# Patient Record
(demographics unavailable — no encounter records)

---

## 2024-11-18 NOTE — PHYSICAL EXAM
[de-identified] : The neck appears flat.  The incision and drain site are clean and healing well.  The Steri-strips were changed. [Midline] : located in midline position [Normal] : orientation to person, place, and time: normal [de-identified] : Extremities: HYDE x 4.   Skin: No obvious skin lesions.   Voice: clear

## 2024-11-18 NOTE — ASSESSMENT
[FreeTextEntry1] : Assessment:  51-year-old man, with history significant for subcentimeter right thyroid cysts, presents for post-op follow-up status post left thyroid lobectomy with isthmusectomy.  Plan: - The surgical pathology is pending.  I will call him once the report has been finalized to review the results. - I instructed him to keep the incision covered and change the Steri-Strips as necessary for a total of 1-month post-op.  Methods for optimal scar care were reviewed. - He was instructed to follow-up with Dr. Aburto in the coming weeks and was encouraged to follow-up with me PRN or at six-months post-op.

## 2024-11-18 NOTE — HISTORY OF PRESENT ILLNESS
[de-identified] : Mr. Lovell presents for post-operative follow-up, status post left thyroid lobectomy with isthmusectomy on 11/08/2024.  He states that he has been doing well and denies pain, dysphagia, or hoarseness.

## 2024-12-02 NOTE — HISTORY OF PRESENT ILLNESS
[FreeTextEntry1] : Mr. Lovell is a 51-year-old male who returns today with regard to hx of nodular thyroid and Diabetes Mellitus type 2.   He did undergo fna of a left sided thyroid nodule February 2020 with initial results c/w [place] IV-suspicious for follicular neoplasm. The aspirated material was subsequently sent out for Thyroseq genetic testing and the results showed no mutations evident, ie negative for all tested genetic alterations associated with thyroid cancer with low risk for malignancy. This was thoroughly explained to patient and discussed in detail.  The left sided nodule was a cystic/complex which at time of fna was very large measuring 6.9 x 3.9 x 5.6 cm f/u  tHYROID us 10/08/24  - 9 mm colloid cyst in right thyroid upper pole TR1 - 8 mm colloid cyst in right midpole TR1  - Left lobe is replaced by 7.9 x 7.0 x 5.7 cm complex cystic nodule containing internal debris, minimally increasing size. (TIRAD -2).  CT scan of the neck, performed 10/08/2024 ([facility]), revealed a 9.4 cm left thyroid nodule resulting in right-sided tracheal deviation. There was no substernal extension.  **** Patient is now s/p left thyroid lobectomy with isthmusectomy on 11/8/24 with Dr. 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 The margins were all negative and there was no extrathyroidal extension or lymphovascular invasion appreciated. There were no lymph nodes sampled. Finaly, there was one parathyroid gland noted which represents one of the unidentified left-sided glands.  patient will be proceeding with careful observation of right thyroid lobe with f/u with Dr. Junior in 6 months  ____________________________________________________  He too has hx of type 2 DM on Metformin 1000 mg BID and Farxiga 10 mg QD.  He has not been carrying out HGM. There has been no significant hypoglycemic s/s.  He denies polyuria, polydipsia, or any visual changes. He too denies any skin lesions, skin breakdown or non-healing areas of skin. He too denies any podiatric concerns. Ophthalmologic evaluation is up to date, no diabetic retinopathy.  POCT A1C returned today 6.8% POCT glucose returned today at  124 mg/dl  ______________________________________________ Additional medical history includes HTN and HLD, afib (last episode Dec 2022) with loop device placed, sleep apnea dx -follows with Dr. Chester-uses dental cpap machine  He is taking enalapril 5 mg, metoprolol 50and Rosuvastatin 10 mg, Eliquis, off vitamin D   He stopped smoking cigars in December 2022 and has now significantly decreased alcohol consumption.  PCP: Dr. Jevon Ambrosio in [place] Cardiologist: Dr. Meléndez at [facility] Cardiologist :Dr. Pelayo in [place]

## 2024-12-02 NOTE — ADDENDUM
[FreeTextEntry1] : poct glucose and a1c were carried out today given diabetes diagnosis blood will be drawn in the office today  Today's evaluation reviewed with Dr. Aburto along with any changes and plan of care.

## 2025-05-19 NOTE — HISTORY OF PRESENT ILLNESS
[FreeTextEntry1] : Mr. Lovell is a 51-year-old male who returns today with regard to hx of nodular thyroid and Diabetes Mellitus type 2. Too has hx of acquired hypothyroidism.  Additional medical history includes hypertension and hyperlipidemia, afib (last episode Dec 2022) with loop device placed, sleep apnea dx -follows with Dr. Ko-uses dental cpap machine - May have to have battery in loop recorder replaced in September 2025. - Due for a colonoscopy, needs to make an appointment  He did undergo fna of a left sided thyroid nodule February 2020 with initial results c/w [place] IV-suspicious for follicular neoplasm. The aspirated material was subsequently sent out for Thyroseq genetic testing and the results showed no mutations evident, ie negative for all tested genetic alterations associated with thyroid cancer with low risk for malignancy. This was thoroughly explained to patient and discussed in detail.  The left sided nodule was a cystic/complex which at time of fna was very large measuring 6.9 x 3.9 x 5.6 cm f/u.  Thyroid us 10/08/24  - 9 mm colloid cyst in right thyroid upper pole TR1 - 8 mm colloid cyst in right midpole TR1  - Left lobe is replaced by 7.9 x 7.0 x 5.7 cm complex cystic nodule containing internal debris, minimally increasing size. (TIRAD -2).  CT scan of the neck, performed 10/08/2024 ([facility]), revealed a 9.4 cm left thyroid nodule resulting in right-sided tracheal deviation. There was no substernal extension.  **** Patient is now s/p left thyroid lobectomy with isthmectomy on 11/8/24 with Dr. 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 The margins were all negative and there was no extrathyroidal extension or lymphovascular invasion appreciated. There were no lymph nodes sampled. Finally, there was one parathyroid gland noted which represents one of the unidentified left-sided glands.  Patient will be proceeding with careful observation of right thyroid lobe with f/u with Dr. Junior in 6 months    He is now taking LT4 50 mcg daily after labs 12/02/24 showed TSH at 4.33. He ran out of LT4 today.  He has been compliant in taking the LT4 daily, away from food or any medication that may inhibit absorption. He has tolerated this medication well without any apparent adverse effects. He denies any temperature intolerance, significant weight changes, or severe fatigue. He in addition denies any palpitations, tremors, anxiousness, change in bowel habits or significant change in moods. ____________________________________________________________________________________________________ He too has hx of type 2 DM. With regard to neuropathy, he reports of intermittent tingling in b/l LE.  He continues on Metformin 1000 mg BID and Farxiga 10 mg QD. Overall, tolerating the medications well.  He has not been carrying out HGM. There has been no significant hypoglycemic s/s.  POCT A1C returned today at 6.4%. Previously returned at 6.8% in December 2024. POCT glucose today at 150 mg/dL.  He denies polyuria, polydipsia, or any visual changes. He too denies any skin lesions, skin breakdown or non-healing areas of skin. He too denies any podiatric concerns. Ophthalmologic evaluation is up to date, no diabetic retinopathy.  Needs to f/u with podiatrist Dr. Scottie Magaña, has not seen in a while. ____________________________________________________________________________________________________  He is taking Enalapril 5 mg, Metoprolol 50 mg, Rosuvastatin 10 mg, Eliquis off vitamin D   He stopped smoking cigars in December 2022 and has now significantly decreased alcohol consumption.  PCP: Dr. Jevon Ambrosio in [place] Cardiologist: Dr. Meléndez at [facility] Cardiologist: Dr. Pelayo in [place]

## 2025-05-19 NOTE — ADDENDUM
[FreeTextEntry1] : POCT glucose testing and Hemoglobin A1c was carried out today given diabetes diagnosis. Blood will be drawn in office today.  This note was written by Denia Grewal on 05/12/2025 acting as medical scribe for Dr. Jose Aburto. I, Dr. Jose Aburto, have read and attest that all the information, medical decision making and discharge instructions within are true and accurate.

## 2025-05-22 NOTE — PHYSICAL EXAM
[Midline] : located in midline position [Normal] : orientation to person, place, and time: normal [de-identified] : The neck appears flat.  His scar is flat and mildly hyperpigmented within a lower neck/upper chest skin crease. [de-identified] : Extremities: HYDE x 4.   Skin: No obvious skin lesions.   Voice: clear

## 2025-05-22 NOTE — ASSESSMENT
[FreeTextEntry1] : Assessment:  51-year-old man, with history significant for subcentimeter right thyroid cysts and hypothyroidism on Synthroid, status post left thyroid lobectomy with isthmusectomy and incidental parathyroidectomy for a large left-sided minimally invasive follicular thyroid carcinoma, a papillary thyroid microcarcinoma, and a subcentimeter NIFTP.  Plan: - All relevant interval labs were reviewed with Mr. Lovell.  He will continue to follow-up with Dr. Aburto and was encouraged to follow-up with me PRN or annually.

## 2025-05-22 NOTE — HISTORY OF PRESENT ILLNESS
[de-identified] : Mr. Lovell presents for follow-up.  He is status post left thyroid lobectomy with isthmusectomy on 11/08/2024.  Labs performed 05/12/2025 revealed TSH = 1.7, serum TG = 9.5, and TG Ab = 15.5.  He states that he has been well and is now on Synthroid 50.